# Patient Record
Sex: MALE | Race: WHITE | NOT HISPANIC OR LATINO | Employment: FULL TIME | URBAN - NONMETROPOLITAN AREA
[De-identification: names, ages, dates, MRNs, and addresses within clinical notes are randomized per-mention and may not be internally consistent; named-entity substitution may affect disease eponyms.]

---

## 2018-04-26 ENCOUNTER — LAB REQUISITION (OUTPATIENT)
Dept: LAB | Facility: HOSPITAL | Age: 23
End: 2018-04-26

## 2018-04-26 DIAGNOSIS — Z12.9 ENCOUNTER FOR SCREENING FOR MALIGNANT NEOPLASM: ICD-10-CM

## 2018-04-26 LAB — PSA SERPL-MCNC: 0.66 NG/ML (ref 0–4)

## 2018-04-26 PROCEDURE — G0103 PSA SCREENING: HCPCS

## 2020-06-04 ENCOUNTER — CONSULT (OUTPATIENT)
Dept: SLEEP MEDICINE | Facility: HOSPITAL | Age: 25
End: 2020-06-04

## 2020-06-04 VITALS
DIASTOLIC BLOOD PRESSURE: 85 MMHG | SYSTOLIC BLOOD PRESSURE: 130 MMHG | WEIGHT: 269.2 LBS | HEART RATE: 85 BPM | HEIGHT: 73 IN | OXYGEN SATURATION: 98 % | BODY MASS INDEX: 35.68 KG/M2

## 2020-06-04 DIAGNOSIS — G47.19 EXCESSIVE DAYTIME SLEEPINESS: ICD-10-CM

## 2020-06-04 DIAGNOSIS — G47.33 OBSTRUCTIVE SLEEP APNEA, ADULT: Primary | ICD-10-CM

## 2020-06-04 PROCEDURE — 99203 OFFICE O/P NEW LOW 30 MIN: CPT | Performed by: INTERNAL MEDICINE

## 2020-06-04 RX ORDER — MELOXICAM 15 MG/1
15 TABLET ORAL DAILY
COMMUNITY
Start: 2020-05-18 | End: 2020-12-04

## 2020-06-04 RX ORDER — LISINOPRIL 10 MG/1
10 TABLET ORAL DAILY
COMMUNITY
Start: 2020-05-18 | End: 2021-05-19

## 2020-06-04 RX ORDER — ATORVASTATIN CALCIUM 40 MG/1
40 TABLET, FILM COATED ORAL
COMMUNITY
Start: 2020-05-26 | End: 2020-12-04

## 2020-06-04 NOTE — PROGRESS NOTES
New Patient Sleep Medicine Consultation    Encounter Date: 6/4/2020         Patient's PCP: Analy Redd APRN  Referring provider: No ref. provider found  Reason for consultation chief complaint: here to establish care. Diagnosed with CAROL ~ 3 years ago in [place] with HST.    Lenny Quigley is a 24 y.o. male whose bedtime is ~ 2100. He  falls asleep after 5 minutes, and is up 0-3 times per night. He wakes up ~ 0400. He endorses 7 hours of sleep. He drinks 0 cups of coffee, 1 gallon teas, and 1 sodas per day. He drinks 0 alcoholic beverages per week.    Lenny Quigley admits to snoring, unrestful sleep, High blod pressure, irritability, Disturbed or restless sleep, memory loss, sleepy driving and difficulty staying asleep. He denies cataplexy, sleep paralysis, or hypnagogic hallucinations. He does not take sedatives or hypnotics. He has some sleepiness with driving. He naps occasionally when unstimulated.    He is a never smoker.     No past medical history on file.  Social History     Socioeconomic History   • Marital status:      Spouse name: Not on file   • Number of children: Not on file   • Years of education: Not on file   • Highest education level: Not on file   , 2 kids  -5 and 3    No family history on file.  2 brothers and 0 sisters  Other family history + for: heart disease and cancer  FH of sleep disorders: self    [place] - 12    Review of Systems:  Constitutional: negative  Eyes: negative  Ears, nose, mouth, throat, and face: negative  Respiratory: negative  Cardiovascular: negative  Gastrointestinal: negative  Genitourinary:negative  Integument/breast: negative  Hematologic/lymphatic: negative  Musculoskeletal:negative  Neurological: negative  Behavioral/Psych: negative  Endocrine: negative  Allergic/Immunologic: negative Patient advised to discuss any positive ROS with PCP.      Vitals:    06/04/20 1108   BP: 130/85   Pulse: 85   SpO2: 98%            "06/04/20  1108   Weight: 122 kg (269 lb 3.2 oz)       Body mass index is 35.52 kg/m². Patient's Body mass index is 35.52 kg/m². BMI is above normal parameters. Recommendations include: referral to primary care.    Neck circumference: >18\"          General: Alert. Cooperative. Well developed. No acute distress.             Head:  Normocephalic. Symmetrical. Atraumatic.              Eyes: Sclera clear. No icterus. PERRLA. Normal EOM.             Ears: No deformities. Normal hearing.             Nose: No septal deviation. No drainage.          Throat: No oral lesions. No thrush. Moist mucous membranes. Trachea midline    Tongue is enlarged    Dentition is fair with crowding and overbite       Pharynx: Posterior pharyngeal pillars are narrow    Mallampati score of IV (only hard palate visible)    Pharynx is normal with unrermarkable tonsils   Chest Wall:  Normal shape. Symmetric expansion with respiration. No tenderness.          Lungs:  Clear to auscultation bilaterally. No wheezes. No rhonchi. No rales. Respirations regular, even and unlabored.            Heart:  Regular rhythm and normal rate. Normal S1 and S2. No murmur.     Abdomen:  Soft, non-tender and non-distended. Normal bowel sounds. No masses.  Extremities:  Moves all extremities well. No edema.           Pulses: Pulses palpable and equal bilaterally.               Skin: Dry. Intact. No bleeding. No rash.           Neuro: Moves all 4 extremities and cranial nerves grossly intact.  Psychiatric: Normal mood and affect.      Current Outpatient Medications:   •  atorvastatin (LIPITOR) 40 MG tablet, Take 40 mg by mouth., Disp: , Rfl:   •  lisinopril (PRINIVIL,ZESTRIL) 10 MG tablet, Take 10 mg by mouth Daily., Disp: , Rfl:   •  meloxicam (MOBIC) 15 MG tablet, Take 15 mg by mouth Daily., Disp: , Rfl:     No results found for: WBC, HGB, HCT, MCV, PLT  No results found for: GLUCOSE, CALCIUM, NA, K, CO2, CL, BUN, CREATININE, EGFRIFAFRI, EGFRIFNONA, BCR, ANIONGAP  No " results found for: INR, PROTIME  No results found for: CKTOTAL, CKMB, CKMBINDEX, TROPONINI, TROPONINT    No results found for: PHART, QAW5AWA, PO2ART]      Assessment and Plan:    1. Obstructive sleep apnea - stable chronic illness and New (to me), additional work-up planned (4)  1. Empiric increase of psi to 10-20 cm H2o  2. Script for new supplies  3. RTC in ~ 3 months  2. Excessive daytime sleepiness - New (to me), additional work-up planned (4)  1. May be secondary to inadequate pressure on CPAP as her snores with mask on  2. DDx: inadequate total sleep time, caffeine use,  3. Inadequate total sleep time - stable chronic illnesses, undiagnosed new problem with uncertain prognosis  and New (to me), additional work-up planned (4)  1. Advised to extend sleep time by 1 hour    I obtained a brief history from the patient, reviewed the medical problems and current medications, and made medical decisions regarding treatment based on that information. Total visit time 30 min, with total of 15 minutes spent counseling patient extensively regarding: PAP therapy and sleep time, and cafeine use     Patient was informed of my departure from [facility] this month. he understands his results will be followed by another practitioner here at [facility].    RTC in 3 months         This document has been electronically signed by Tomas Brsicoe MD on June 4, 2020         CC: Analy Redd, APRN          No ref. provider found

## 2020-12-04 PROCEDURE — U0003 INFECTIOUS AGENT DETECTION BY NUCLEIC ACID (DNA OR RNA); SEVERE ACUTE RESPIRATORY SYNDROME CORONAVIRUS 2 (SARS-COV-2) (CORONAVIRUS DISEASE [COVID-19]), AMPLIFIED PROBE TECHNIQUE, MAKING USE OF HIGH THROUGHPUT TECHNOLOGIES AS DESCRIBED BY CMS-2020-01-R: HCPCS | Performed by: EMERGENCY MEDICINE

## 2021-12-17 ENCOUNTER — OFFICE VISIT (OUTPATIENT)
Dept: SLEEP MEDICINE | Facility: HOSPITAL | Age: 26
End: 2021-12-17

## 2021-12-17 VITALS
HEIGHT: 73 IN | DIASTOLIC BLOOD PRESSURE: 91 MMHG | HEART RATE: 88 BPM | WEIGHT: 288 LBS | OXYGEN SATURATION: 97 % | SYSTOLIC BLOOD PRESSURE: 138 MMHG | BODY MASS INDEX: 38.17 KG/M2

## 2021-12-17 DIAGNOSIS — E66.01 MORBID OBESITY (HCC): ICD-10-CM

## 2021-12-17 DIAGNOSIS — G25.81 RESTLESS LEGS SYNDROME (RLS): ICD-10-CM

## 2021-12-17 DIAGNOSIS — F45.8 OTHER SOMATOFORM DISORDERS: ICD-10-CM

## 2021-12-17 DIAGNOSIS — G47.33 OBSTRUCTIVE SLEEP APNEA, ADULT: Primary | ICD-10-CM

## 2021-12-17 PROCEDURE — 99213 OFFICE O/P EST LOW 20 MIN: CPT | Performed by: NURSE PRACTITIONER

## 2021-12-17 RX ORDER — METFORMIN HYDROCHLORIDE 500 MG/1
TABLET, EXTENDED RELEASE ORAL
COMMUNITY
Start: 2021-11-26

## 2021-12-17 RX ORDER — LOSARTAN POTASSIUM 50 MG/1
TABLET ORAL
COMMUNITY
Start: 2021-12-04

## 2021-12-17 NOTE — PROGRESS NOTES
Sleep Clinic Follow Up    Date: 2021  Primary Care Provider: Analy Redd APRN    Last office visit: 2020 (I reviewed this note)    CC: Follow up: CAROL on CPAP      Interim History:  Since the last visit:    1) severe CAROL -  Lenny Quigley has remained compliant with CPAP. He denies mask and machine issues, dry mouth, headaches, or pressures intolerance. He denies abnormal dreams, sleep paralysis, nasal congestion, URI sx.    2) Patient reports frequent RLS symptoms at least a few times per week. Occasionally, leg movements keep him from falling asleep as well as staying asleep. Symptoms get somewhat better if he moves his feet, but immediately restart if he stops moving. He awakens frequently with his sheets disheveled at the bottom of the bed. He has not taken anything for this and has not had iron or ferritin checked.    Sleep Testin. HST on 2017, AHI of 84 ([place])   2. Currently on 10-20 cm H2O    PAP Data:    No data available  Mask type: Full face mask  DME: [facility]    Bed time: 2000  Sleep latency: 5 minutes  Number of times awakens during the night: 2-3  Wake time: 9400-7778  Estimated total sleep time at night: 7 hours  Caffeine intake: 1 cups of coffee, 2 cups of tea, and 1-2 sodas per day  Alcohol intake: 0 drinks per week  Nap time: very rare   Sleepiness with Driving: none     [place] - 8    PMHx, FH, SH reviewed and pertinent changes are: Started Losartan and Lipitor.     REVIEW OF SYSTEMS:   Negative for chest pain, SOA, fever, chills, cough, N/V/D, abdominal pain.    Smoking:none    Lenny Quigley  has no history on file for tobacco use.      Exam:  Vitals:    21 08   BP: 138/91   Pulse: 88   SpO2: 97%           21   Weight: 131 kg (288 lb)     Body mass index is 38.01 kg/m². Patient's Body mass index is 38.01 kg/m². indicating that he is morbidly obese (BMI > 40 or > 35 with obesity - related health condition). Obesity-related health  conditions include the following: obstructive sleep apnea, hypertension and dyslipidemias. Obesity is unchanged. BMI is is above average; BMI management plan is completed. I recommend portion control and increasing exercise.      Gen:                No distress, conversant, pleasant, appears stated age, alert, oriented  Eyes:               Anicteric sclera, moist conjunctiva, no lid lag                           PERRL, EOMI   Heent:             NC/AT                          Oropharynx clear                          Normal hearing  Lungs:             Normal effort, non-labored breathing                          Clear to auscultation bilaterally          CV:                  Normal S1/S2, no murmur                          No lower extremity edema  ABD:               Soft, rounded, non-distended                          Normal bowel sounds                    Psych:             Appropriate affect  Neuro:             CN 2-12 appear intact    Past Medical History:   Diagnosis Date   • Hyperlipidemia    • Hypertension    • Sleep apnea        Current Outpatient Medications:   •  atorvastatin (LIPITOR) 20 MG tablet, Take 40 mg by mouth Daily., Disp: , Rfl:   •  losartan (COZAAR) 25 MG tablet, TAKE 1-2 TABLETS DAILY FOR HYPERTENSION, Disp: , Rfl:   •  metFORMIN ER (GLUCOPHAGE-XR) 500 MG 24 hr tablet, TAKE 1 TABLET (500 MG TOTAL) BY MOUTH DAILY WITH BREAKFAST., Disp: , Rfl:     No results found for: WBC, RBC, HGB, HCT, MCV, MCH, MCHC, RDW, RDWSD, MPV, PLT, NEUTRORELPCT, LYMPHORELPCT, MONORELPCT, EOSRELPCT, BASORELPCT, AUTOIGPER, NEUTROABS, LYMPHSABS, MONOSABS, EOSABS, BASOSABS, AUTOIGNUM, NRBC    Lab Results   Component Value Date    BUN 15 10/15/2021    CREATININE 0.9 10/15/2021    EGFRIFAFRI 122 10/15/2021    K 4.1 10/15/2021    CO2 27 10/15/2021    CALCIUM 9.3 10/15/2021    ALBUMIN 4.3 10/15/2021    AST 24 10/15/2021    ALT 48 10/15/2021       Assessment and Plan:    1. Obstructive sleep apnea - Established, stable  (1)  1. Compliant with PAP therapy  2. Advised patient of new safety recall of [employer] CPAP/BiPAP/AVAPS machines. We discussed the risk versus benefit of continuing usage of PAP therapy. The company has recommended that patients stop usage of their current machines. Instructed patient to call [employer] ([phone]) to check if the machine is under warranty for repair or replacement. [place] machine with serial number on website: www.[employer]/src-updates. Follow up with DME company regarding any further questions. Advised patient to avoid unapproved ozone-type CPAP . Advised to call us if any further questions or problems  3. Script for PAP supplies  4. Return to clinic in 1 year with compliance report unless change in symptoms in interim period  2. Restless leg syndrome/Periodic limb movement disorder (RLS/PLMD) - New (to me), additional work-up planned (4)   1. Check iron/ferritin - call with results  2. Consider Requip versus Mirapex if labs WNL - will follow up sooner if starting new medication   3. Morbid obesity - BMI 38.0 - stable chronic illness       I spent 20 minutes caring for Lenny on this date of service. This time includes time spent by me in the following activities: preparing for the visit, reviewing tests, obtaining and/or reviewing a separately obtained history, performing a medically appropriate examination and/or evaluation , counseling and educating the patient/family/caregiver and documenting information in the medical record; discussing PAP therapy, PAP compliance, PAP maintenance and Lab testing    RTC in 12 months. Patient agrees to return sooner if changes in symptoms.        This document has been electronically signed by JALYN Murphy on December 17, 2021 08:23 CST          CC: Analy Redd APRN          No ref. provider found

## 2022-02-28 ENCOUNTER — DOCUMENTATION (OUTPATIENT)
Dept: SLEEP MEDICINE | Facility: HOSPITAL | Age: 27
End: 2022-02-28

## 2022-02-28 ENCOUNTER — TELEPHONE (OUTPATIENT)
Dept: SLEEP MEDICINE | Facility: HOSPITAL | Age: 27
End: 2022-02-28

## 2022-02-28 NOTE — PROGRESS NOTES
Lab results faxed from outside facility for ferritin and iron profile.    Ferritin- 217  Iron- 75  Iron binding capacity- 375  Iron sat.- 20%    Informed patient of lab results. Can continue to monitor at 6 month-1 year intervals given borderline low iron saturation.   Offered Requip, patient declined at this time. Advised to follow up sooner if needed.

## 2022-02-28 NOTE — TELEPHONE ENCOUNTER
----- Message from JALYN Murphy sent at 2/28/2022 10:20 AM CST -----  Regarding: labs  Will you let his know that his iron/ferritin that I checked for his restless legs were normal? We can start Requip for restless legs if he wants to do that still.   Just let me know which pharmacy.  Tell him he will take 1/2 tablet x 4 days, then increase to 1 tablet, up to 2 tablets if needed. Take 1 hour before bed.    If we do start the Requip, we will need to follow up in 1 month.

## 2022-03-21 ENCOUNTER — TRANSCRIBE ORDERS (OUTPATIENT)
Dept: PODIATRY | Facility: CLINIC | Age: 27
End: 2022-03-21

## 2022-03-21 DIAGNOSIS — M79.671 BILATERAL FOOT PAIN: Primary | ICD-10-CM

## 2022-03-21 DIAGNOSIS — M77.30 CALCANEAL SPUR, UNSPECIFIED LATERALITY: ICD-10-CM

## 2022-03-21 DIAGNOSIS — M79.672 BILATERAL FOOT PAIN: Primary | ICD-10-CM

## 2022-03-30 ENCOUNTER — OFFICE VISIT (OUTPATIENT)
Dept: PODIATRY | Facility: CLINIC | Age: 27
End: 2022-03-30

## 2022-03-30 VITALS — OXYGEN SATURATION: 98 % | BODY MASS INDEX: 39.97 KG/M2 | HEART RATE: 98 BPM | WEIGHT: 301.6 LBS | HEIGHT: 73 IN

## 2022-03-30 DIAGNOSIS — M79.672 BILATERAL FOOT PAIN: Primary | ICD-10-CM

## 2022-03-30 DIAGNOSIS — M79.671 BILATERAL FOOT PAIN: Primary | ICD-10-CM

## 2022-03-30 DIAGNOSIS — M72.2 PLANTAR FASCIITIS: ICD-10-CM

## 2022-03-30 PROCEDURE — 99203 OFFICE O/P NEW LOW 30 MIN: CPT | Performed by: PODIATRIST

## 2022-03-30 PROCEDURE — 20550 NJX 1 TENDON SHEATH/LIGAMENT: CPT | Performed by: PODIATRIST

## 2022-03-30 RX ORDER — METOPROLOL SUCCINATE 50 MG/1
50 TABLET, EXTENDED RELEASE ORAL DAILY
COMMUNITY
Start: 2022-03-03 | End: 2023-03-04

## 2022-03-30 NOTE — PROGRESS NOTES
Lenny Quigley  1995  26 y.o. male    Bilateral heel pain, left is worse at today's visit.    03/30/2022    Chief Complaint   Patient presents with   • Left Foot - Pain   • Right Foot - Pain       History of Present Illness    Lenny Quigley is a 26 y.o.male who presents with chief complaint of heel pain to both feet.       Past Medical History:   Diagnosis Date   • Diabetes mellitus (HCC)    • Hyperlipidemia    • Hypertension    • Sleep apnea          History reviewed. No pertinent surgical history.      Family History   Problem Relation Age of Onset   • Diabetes Father    • Diabetes Paternal Grandmother    • Osteoporosis Paternal Grandmother    • Hypertension Paternal Grandfather    • Heart disease Paternal Grandfather        No Known Allergies    Social History     Socioeconomic History   • Marital status:    Tobacco Use   • Smoking status: Never Smoker   • Smokeless tobacco: Never Used   Vaping Use   • Vaping Use: Never used   Substance and Sexual Activity   • Alcohol use: Not Currently   • Drug use: Defer   • Sexual activity: Defer         Current Outpatient Medications   Medication Sig Dispense Refill   • atorvastatin (LIPITOR) 40 MG tablet Take 40 mg by mouth Daily.     • losartan (COZAAR) 50 MG tablet TAKE 1-2 TABLETS DAILY FOR HYPERTENSION     • metFORMIN ER (GLUCOPHAGE-XR) 500 MG 24 hr tablet TAKE 1 TABLET (500 MG TOTAL) BY MOUTH DAILY WITH BREAKFAST.     • metoprolol succinate XL (TOPROL-XL) 50 MG 24 hr tablet Take 50 mg by mouth Daily.       No current facility-administered medications for this visit.       Review of Systems   Constitutional: Negative.    HENT: Negative.    Eyes: Negative.    Respiratory: Negative.    Cardiovascular: Negative.    Gastrointestinal: Negative.    Endocrine: Negative.    Genitourinary: Negative.    Musculoskeletal:        Foot pain   Allergic/Immunologic: Negative.    Neurological: Negative.    Hematological: Negative.    Psychiatric/Behavioral:  "Negative.          OBJECTIVE    Pulse 98   Ht 185.4 cm (72.99\")   Wt (!) 137 kg (301 lb 9.6 oz)   SpO2 98%   BMI 39.80 kg/m²     Physical Exam  Vitals reviewed.   Constitutional:       General: He is not in acute distress.     Appearance: He is well-developed.   HENT:      Head: Normocephalic and atraumatic.      Nose: Nose normal.   Eyes:      Conjunctiva/sclera: Conjunctivae normal.      Pupils: Pupils are equal, round, and reactive to light.   Pulmonary:      Effort: Pulmonary effort is normal. No respiratory distress.      Breath sounds: No wheezing.   Musculoskeletal:         General: Tenderness present. No deformity. Normal range of motion.   Skin:     General: Skin is warm and dry.      Capillary Refill: Capillary refill takes less than 2 seconds.   Neurological:      Mental Status: He is alert and oriented to person, place, and time.   Psychiatric:         Behavior: Behavior normal.         Thought Content: Thought content normal.           Lower Extremity Exam:     Cardiovascular:    Palpable pedal pulses bilateral   Musculoskeletal:  Muscle strength is wnl bilateral   POP to medial tubercle of bilateral calcaneus        Procedures    Plantar Fasciits Injection  Date/Time: 03/30/2022  Performed by: MALGORZATA SWARTZ  Authorized by: MALGORZATA SWARTZ   Consent: Verbal consent obtained. Written consent obtained.  Risks and benefits: risks, benefits and alternatives were discussed  Consent given by: patient  Patient identity confirmed: verbally with patient  Indications: pain relief  Nerve block body site: right and left heel.  Sedation:  Patient sedated: no    Patient position: sitting  Needle size: 27 G  Local anesthetic: 0.5% Marcaine plain, Kenalog 40 mg/ml , Decadron 4 mg/mL.   Outcome: pain improved  Patient tolerance: Patient tolerated the procedure well with no immediate complications     ASSESSMENT AND PLAN    Diagnoses and all orders for this visit:    1. Bilateral foot pain (Primary)  -     XR foot " 3+ vw bilateral; Future    2. Plantar fasciitis        - Comprehensive foot and ankle exam performed  - X-rays reviewed.  No acute osseous or articular abnormalities.  - Steroid injection right and left heel  - Patient advised to stretch, ice and to make appropriate shoe gear changes to include wearing athletic type shoes with supportive insoles. Patient was given written instructions on how to correctly perform the stretching of the Achilles tendon/calf stretches, and the heel spur/plantar fasciitis regimen. Limit bare foot walking.    - Recommended over-the-counter insole such as power steps, spenco or walk fit  to properly support the arch in order to alleviate the tension and stress on the plantar fascia associated with normal daily walking. Patient was educated on the break-in period for new arch supports.  - All questions were answered to the patient's satisfaction.  - RTC in 6-8 weeks as needed.             This document has been electronically signed by Evens Cho DPM on March 30, 2022 20:21 CDT     3/30/2022  20:21 CDT

## 2022-04-06 RX ORDER — TRIAMCINOLONE ACETONIDE 40 MG/ML
10 INJECTION, SUSPENSION INTRA-ARTICULAR; INTRAMUSCULAR ONCE
Status: COMPLETED | OUTPATIENT
Start: 2022-04-06 | End: 2022-04-06

## 2022-04-06 RX ORDER — DEXAMETHASONE SODIUM PHOSPHATE 4 MG/ML
2 INJECTION, SOLUTION INTRA-ARTICULAR; INTRALESIONAL; INTRAMUSCULAR; INTRAVENOUS; SOFT TISSUE ONCE
Status: COMPLETED | OUTPATIENT
Start: 2022-04-06 | End: 2022-04-06

## 2022-04-06 RX ADMIN — DEXAMETHASONE SODIUM PHOSPHATE 2 MG: 4 INJECTION, SOLUTION INTRA-ARTICULAR; INTRALESIONAL; INTRAMUSCULAR; INTRAVENOUS; SOFT TISSUE at 14:41

## 2022-04-06 RX ADMIN — DEXAMETHASONE SODIUM PHOSPHATE 2 MG: 4 INJECTION, SOLUTION INTRA-ARTICULAR; INTRALESIONAL; INTRAMUSCULAR; INTRAVENOUS; SOFT TISSUE at 14:42

## 2022-04-06 RX ADMIN — TRIAMCINOLONE ACETONIDE 10 MG: 40 INJECTION, SUSPENSION INTRA-ARTICULAR; INTRAMUSCULAR at 14:43

## 2022-04-06 RX ADMIN — TRIAMCINOLONE ACETONIDE 10 MG: 40 INJECTION, SUSPENSION INTRA-ARTICULAR; INTRAMUSCULAR at 14:42

## 2023-01-31 ENCOUNTER — TELEMEDICINE (OUTPATIENT)
Dept: SLEEP MEDICINE | Facility: HOSPITAL | Age: 28
End: 2023-01-31
Payer: COMMERCIAL

## 2023-01-31 VITALS — BODY MASS INDEX: 39.49 KG/M2 | HEIGHT: 73 IN | WEIGHT: 298 LBS

## 2023-01-31 DIAGNOSIS — E66.01 MORBID OBESITY: ICD-10-CM

## 2023-01-31 DIAGNOSIS — G47.33 OBSTRUCTIVE SLEEP APNEA: Primary | ICD-10-CM

## 2023-01-31 PROCEDURE — 99213 OFFICE O/P EST LOW 20 MIN: CPT | Performed by: NURSE PRACTITIONER

## 2023-01-31 RX ORDER — BUPROPION HYDROCHLORIDE 150 MG/1
1 TABLET ORAL DAILY
COMMUNITY
Start: 2023-01-19